# Patient Record
Sex: MALE | Race: WHITE
[De-identification: names, ages, dates, MRNs, and addresses within clinical notes are randomized per-mention and may not be internally consistent; named-entity substitution may affect disease eponyms.]

---

## 2018-11-10 ENCOUNTER — HOSPITAL ENCOUNTER (INPATIENT)
Dept: HOSPITAL 92 - ERS | Age: 63
LOS: 2 days | Discharge: HOME | DRG: 167 | End: 2018-11-12
Attending: INTERNAL MEDICINE | Admitting: INTERNAL MEDICINE
Payer: COMMERCIAL

## 2018-11-10 VITALS — BODY MASS INDEX: 40.1 KG/M2

## 2018-11-10 DIAGNOSIS — J44.9: ICD-10-CM

## 2018-11-10 DIAGNOSIS — F31.9: ICD-10-CM

## 2018-11-10 DIAGNOSIS — F12.10: ICD-10-CM

## 2018-11-10 DIAGNOSIS — E66.01: ICD-10-CM

## 2018-11-10 DIAGNOSIS — Z86.711: ICD-10-CM

## 2018-11-10 DIAGNOSIS — R91.1: ICD-10-CM

## 2018-11-10 DIAGNOSIS — R04.2: ICD-10-CM

## 2018-11-10 DIAGNOSIS — B95.4: ICD-10-CM

## 2018-11-10 DIAGNOSIS — R91.8: Primary | ICD-10-CM

## 2018-11-10 DIAGNOSIS — F17.210: ICD-10-CM

## 2018-11-10 DIAGNOSIS — I10: ICD-10-CM

## 2018-11-10 LAB
ALBUMIN SERPL BCG-MCNC: 4.3 G/DL (ref 3.4–4.8)
ALP SERPL-CCNC: 94 U/L (ref 40–150)
ALT SERPL W P-5'-P-CCNC: 13 U/L (ref 8–55)
ANION GAP SERPL CALC-SCNC: 11 MMOL/L (ref 10–20)
AST SERPL-CCNC: 15 U/L (ref 5–34)
BASOPHILS # BLD AUTO: 0 THOU/UL (ref 0–0.2)
BASOPHILS NFR BLD AUTO: 0.4 % (ref 0–1)
BILIRUB SERPL-MCNC: 0.3 MG/DL (ref 0.2–1.2)
BUN SERPL-MCNC: 13 MG/DL (ref 8.4–25.7)
CALCIUM SERPL-MCNC: 9.7 MG/DL (ref 7.8–10.44)
CHLORIDE SERPL-SCNC: 106 MMOL/L (ref 98–107)
CO2 SERPL-SCNC: 22 MMOL/L (ref 23–31)
CREAT CL PREDICTED SERPL C-G-VRATE: 0 ML/MIN (ref 70–130)
EOSINOPHIL # BLD AUTO: 0.1 THOU/UL (ref 0–0.7)
EOSINOPHIL NFR BLD AUTO: 1.6 % (ref 0–10)
GLOBULIN SER CALC-MCNC: 3.5 G/DL (ref 2.4–3.5)
GLUCOSE SERPL-MCNC: 107 MG/DL (ref 80–115)
HGB BLD-MCNC: 12.8 G/DL (ref 14–18)
LYMPHOCYTES # BLD: 1.8 THOU/UL (ref 1.2–3.4)
LYMPHOCYTES NFR BLD AUTO: 24.5 % (ref 21–51)
MCH RBC QN AUTO: 30.4 PG (ref 27–31)
MCV RBC AUTO: 88.4 FL (ref 78–98)
MONOCYTES # BLD AUTO: 0.5 THOU/UL (ref 0.11–0.59)
MONOCYTES NFR BLD AUTO: 7.5 % (ref 0–10)
NEUTROPHILS # BLD AUTO: 4.8 THOU/UL (ref 1.4–6.5)
NEUTROPHILS NFR BLD AUTO: 66.1 % (ref 42–75)
PLATELET # BLD AUTO: 241 THOU/UL (ref 130–400)
POTASSIUM SERPL-SCNC: 4.9 MMOL/L (ref 3.5–5.1)
RBC # BLD AUTO: 4.21 MILL/UL (ref 4.7–6.1)
SODIUM SERPL-SCNC: 134 MMOL/L (ref 136–145)
WBC # BLD AUTO: 7.2 THOU/UL (ref 4.8–10.8)

## 2018-11-10 PROCEDURE — 71046 X-RAY EXAM CHEST 2 VIEWS: CPT

## 2018-11-10 PROCEDURE — 71260 CT THORAX DX C+: CPT

## 2018-11-10 PROCEDURE — 87070 CULTURE OTHR SPECIMN AEROBIC: CPT

## 2018-11-10 PROCEDURE — 88112 CYTOPATH CELL ENHANCE TECH: CPT

## 2018-11-10 PROCEDURE — 87149 DNA/RNA DIRECT PROBE: CPT

## 2018-11-10 PROCEDURE — 99406 BEHAV CHNG SMOKING 3-10 MIN: CPT

## 2018-11-10 PROCEDURE — 85025 COMPLETE CBC W/AUTO DIFF WBC: CPT

## 2018-11-10 PROCEDURE — 87205 SMEAR GRAM STAIN: CPT

## 2018-11-10 PROCEDURE — 94640 AIRWAY INHALATION TREATMENT: CPT

## 2018-11-10 PROCEDURE — 80053 COMPREHEN METABOLIC PANEL: CPT

## 2018-11-10 PROCEDURE — 87040 BLOOD CULTURE FOR BACTERIA: CPT

## 2018-11-10 PROCEDURE — 80048 BASIC METABOLIC PNL TOTAL CA: CPT

## 2018-11-10 PROCEDURE — 88305 TISSUE EXAM BY PATHOLOGIST: CPT

## 2018-11-10 PROCEDURE — 36415 COLL VENOUS BLD VENIPUNCTURE: CPT

## 2018-11-10 PROCEDURE — 88313 SPECIAL STAINS GROUP 2: CPT

## 2018-11-10 PROCEDURE — 93005 ELECTROCARDIOGRAM TRACING: CPT

## 2018-11-10 PROCEDURE — 88312 SPECIAL STAINS GROUP 1: CPT

## 2018-11-10 RX ADMIN — HYDROCODONE BITARTRATE AND ACETAMINOPHEN PRN TAB: 10; 325 TABLET ORAL at 21:21

## 2018-11-10 NOTE — RAD
CHEST 2 VIEWS:

 

Date:  11/10/18 

 

HISTORY:  

Coughing up blood. 

 

COMPARISON:  

Radiograph from 2017. 

 

FINDINGS:

There is an abnormal round mass-like density in the right middle/lower lobe. Old left-sided rib fract
ures. Cardiac silhouette is within normal limits. 

 

IMPRESSION: 

Round nodule projecting over the right lower lung. Nonemergent CT of chest recommended. 

 

CODE T.

CODE LN.

 

 

POS: VERN

## 2018-11-10 NOTE — CT
CHEST CT SCAN WITH IV CONTRAST:

11/10/18

 

HISTORY: 

63-year-old male with history of coughing up bright breast blood since Friday. 

 

COMPARISON:  

Chest PA and lateral, 11/10/18. 

 

FINDINGS:  

Poorly circumscribed somewhat spiculated appearing 2.6 cm diameter mass opacity in the right middle l
obe adjacent to the minor fissure. There are some minimal patchy interstitial and alveolar nodular ch
anges in the right middle lobe as well. Very minute pleural based stranding in the right lung base. M
inimal ground glass opacity changes in the right upper lobe, nonspecific. No mediastinal mass or nancy
opathy. No pleural effusion. No pericardial effusion.  

 

IMPRESSION:  

2.6 cm diameter poorly circumscribed somewhat spiculated appearing mass in the right middle lobe with
 some additional patchy parenchymal changes In the right middle lobe and right upper lobe, nonspecifi
c, possibly some associated pneumonitis. I feel that this is most concerning for a malignancy. Consid
er followup PET scan for further assessment. 

 

POS: VERN

## 2018-11-10 NOTE — HP
REASON FOR ADMISSION:  Right lung mass, hemoptysis.

 

HISTORY OF PRESENTING ILLNESS:  The patient gives history of cough with 
expectoration of blood from last 3 days.  No history of fever.  Has some mild 
shortness of breath, but nothing out of the ordinary states patient.  No fever.
  Patient states he has had prior pneumonias multiple times and has been 
hospitalized here for the same.  He used to smoke one pack a day for the last 
25 years and currently is cutting down.  No history of weight loss or loss of 
appetite.

 

PAST MEDICAL AND SURGICAL HISTORY:  Hypertension, hypertriglyceridemia, history 
of pulmonary embolus 5 years back, right total knee replacement, back surgery, 
left forearm tendon reattachment, left rotator cuff repair, bipolar disorder.

 

CURRENT MEDICATIONS:  The patient is on lisinopril 10 mg twice daily, Norco 
p.r.n. for pain, Advil 800 mg twice daily, Zoloft 300 mg p.o. at bedtime, 
clonazepam 4 mg p.o. at bedtime, metoprolol extended release 50 mg twice daily.

 

ALLERGIES:  NAPROSYN, but he is comfortable taking Advil.

 

PERSONAL HISTORY:  Smokes one pack a day and has been doing so for the last 25 
years.  Drinks on social occasions.  Does not abuse alcohol.  Please note 
patient states he has been cutting down on his smoking and is currently down to 
2-3 per week.

 

FAMILY HISTORY:  Mother  at the age of 81 years.  Father  at the age of 
82 years.  He has had history of rectal cancer and nasal cancer as well.

 

CODE STATUS:  FULL.  Power of  is his wife.

 

REVIEW OF SYSTEMS:  The following complete review of systems was negative, 
unless otherwise mentioned in the HPI or below:

Constitutional:  Weight loss or gain, ability to conduct usual activities.

Skin:  Rash, itching.

Eyes:  Double vision, pain.

ENT/Mouth:  Nose bleeding, neck stiffness, pain, tenderness.

Cardiovascular:  Palpitations, dyspnea on exertion, orthopnea.

Respiratory:  Shortness of breath, wheezing, cough, hemoptysis, fever or night 
sweats.

Gastrointestinal:  Poor appetite, abdominal pain, heartburn, nausea, vomiting, 
constipation, or diarrhea.

Genitourinary:  Urgency, frequency, dysuria, nocturia.

Musculoskeletal:  Pain, swelling.

Neurologic/Psychiatric:  Anxiety, depression.

Allergy/Immunologic:  Skin rash, bleeding tendency. 

 

PHYSICAL EXAMINATION:

GENERAL:  Patient is a 63-year-old male who is currently not in any acute 
distress.

VITAL SIGNS:  Blood pressure 176/100, pulse 70 per minute, respiratory rate 18 
per minute, temperature 97.9 degrees Fahrenheit, saturating 97% on room air.

NECK:  Supple, no elevated JVD.

EYES:  Extraocular muscles intact.  Pupils reacting to light.

ORAL CAVITY:  Mucous membranes are moist.  No exudates or congestion.

CARDIOVASCULAR SYSTEM:  S1, S2 heard.  Regular rhythm.

RESPIRATORY SYSTEM:  Air entry 1+ bilateral.  Scattered rhonchi plus no rales.

ABDOMEN:  Soft, bowel sounds heard.  No tenderness, rigidity or guarding.

EXTREMITIES:  No peripheral edema or calf tenderness.

VASCULAR SYSTEM:  Peripheral pulses 2+ bilateral, no ischemic ulcerations or 
gangrene.

CENTRAL NERVOUS SYSTEM:  No gross focal deficits noted.  Patient is alert, awake
, oriented well.

PSYCHIATRIC SYSTEM:  The patient's mood is euthymic.  No hallucinations or 
delusions.

 

LABORATORY DATA AND IMAGING DATA:  White count of 7, hemoglobin and hematocrit 
12 and 37, platelet count 241 with 66% neutrophils.  Electrolytes are stable.  
Serum bicarbonate 22, BUN 13, creatinine 0.8.  Liver enzymes are within normal 
limits.  Albumin is 4.3.  CT chest done shows 2.6 cm poorly circumscribed 
somewhat spiculated appearing mass in the right middle lobe with additional 
patchy parenchymal changes in the right middle lobe and right upper lobe.  This 
is suspicious for malignancy.

 

CLINICAL IMPRESSION AND PLAN:  The patient will be admitted to medical floor 
for right lung mass with history of heavy smoking in the past.  He also has 
hemoptysis.  He will be placed on DuoNeb, short course of IV steroids for 24 
hours and will discontinue.  He will continue his home medications of Klonopin, 
lisinopril and Lopressor at a lower dose and sertraline.  The patient will 
likely need either percutaneous biopsy of his lung lesion or bronchoscopic 
biopsy.  Further procedures and plan will be per Pulmonary advice.  We will 
continue to closely monitor him on medical floor.

 

DENISE

## 2018-11-11 LAB
ANION GAP SERPL CALC-SCNC: 14 MMOL/L (ref 10–20)
BASOPHILS # BLD AUTO: 0 THOU/UL (ref 0–0.2)
BASOPHILS NFR BLD AUTO: 0.3 % (ref 0–1)
BUN SERPL-MCNC: 13 MG/DL (ref 8.4–25.7)
CALCIUM SERPL-MCNC: 9.5 MG/DL (ref 7.8–10.44)
CHLORIDE SERPL-SCNC: 105 MMOL/L (ref 98–107)
CO2 SERPL-SCNC: 21 MMOL/L (ref 23–31)
CREAT CL PREDICTED SERPL C-G-VRATE: 175 ML/MIN (ref 70–130)
EOSINOPHIL # BLD AUTO: 0 THOU/UL (ref 0–0.7)
EOSINOPHIL NFR BLD AUTO: 0.7 % (ref 0–10)
GLUCOSE SERPL-MCNC: 148 MG/DL (ref 80–115)
HGB BLD-MCNC: 12.8 G/DL (ref 14–18)
LYMPHOCYTES # BLD: 0.7 THOU/UL (ref 1.2–3.4)
LYMPHOCYTES NFR BLD AUTO: 11.5 % (ref 21–51)
MCH RBC QN AUTO: 29.4 PG (ref 27–31)
MCV RBC AUTO: 91.5 FL (ref 78–98)
MONOCYTES # BLD AUTO: 0.1 THOU/UL (ref 0.11–0.59)
MONOCYTES NFR BLD AUTO: 2.2 % (ref 0–10)
NEUTROPHILS # BLD AUTO: 5.3 THOU/UL (ref 1.4–6.5)
NEUTROPHILS NFR BLD AUTO: 85.4 % (ref 42–75)
PLATELET # BLD AUTO: 242 THOU/UL (ref 130–400)
POTASSIUM SERPL-SCNC: 4.2 MMOL/L (ref 3.5–5.1)
RBC # BLD AUTO: 4.36 MILL/UL (ref 4.7–6.1)
SODIUM SERPL-SCNC: 136 MMOL/L (ref 136–145)
WBC # BLD AUTO: 6.2 THOU/UL (ref 4.8–10.8)

## 2018-11-11 RX ADMIN — HYDROCODONE BITARTRATE AND ACETAMINOPHEN PRN TAB: 10; 325 TABLET ORAL at 21:04

## 2018-11-11 RX ADMIN — HYDROCODONE BITARTRATE AND ACETAMINOPHEN PRN TAB: 10; 325 TABLET ORAL at 15:38

## 2018-11-11 RX ADMIN — HYDROCODONE BITARTRATE AND ACETAMINOPHEN PRN TAB: 10; 325 TABLET ORAL at 09:20

## 2018-11-11 RX ADMIN — HYDROCODONE BITARTRATE AND ACETAMINOPHEN PRN TAB: 10; 325 TABLET ORAL at 05:11

## 2018-11-11 NOTE — PDOC.PN
- Subjective


Encounter Start Date: 11/11/18


Encounter Start Time: 09:45


Subjective: a bit anxious, no sob


-: no further hemoptysis





- Objective


Resuscitation Status: 


 











Resuscitation Status           FULL:Full Resuscitation














MAR Reviewed: Yes


Vital Signs & Weight: 


 Vital Signs (12 hours)











  Temp Pulse Resp BP BP Pulse Ox


 


 11/11/18 09:15     114/55 L  


 


 11/11/18 08:00  98.4 F  89  16   114/55 L  94 L


 


 11/11/18 07:55       96


 


 11/11/18 07:07   75  16    94 L


 


 11/10/18 23:31   71  16    97


 


 11/10/18 22:03   67  16    97








 Weight











Weight                         296 lb














Result Diagrams: 


 11/11/18 04:17





 11/11/18 04:17





Phys Exam





- Physical Examination


HEENT: PERRLA, moist MMs


Neck: no JVD, supple


Respiratory: no wheezing, no rales


Cardiovascular: RRR, no significant murmur


Gastrointestinal: soft, non-tender, positive bowel sounds


Musculoskeletal: no edema, pulses present


Neurological: non-focal, moves all 4 limbs


Psychiatric: normal affect, A&O x 3





Dx/Plan


(1) Mass of middle lobe of right lung


Code(s): R91.8 - OTHER NONSPECIFIC ABNORMAL FINDING OF LUNG FIELD   Status: 

Acute   





(2) COPD (chronic obstructive pulmonary disease)


Status: Chronic   


Qualifiers: 


   COPD type: chronic bronchitis   Chronic bronchitis type: unspecified   

Qualified Code(s): J42 - Unspecified chronic bronchitis   





(3) Hemoptysis


Code(s): R04.2 - HEMOPTYSIS   Status: Acute   





(4) Bipolar disorder


Code(s): F31.9 - BIPOLAR DISORDER, UNSPECIFIED   Status: Chronic   





(5) Tobacco abuse


Code(s): Z72.0 - TOBACCO USE   Status: Chronic   





(6) Hypertension


Code(s): I10 - ESSENTIAL (PRIMARY) HYPERTENSION   Status: Chronic   


Qualifiers: 


   Hypertension type: essential hypertension   Qualified Code(s): I10 - 

Essential (primary) hypertension   





(7) Morbid obesity with BMI of 40.0-44.9, adult


Code(s): E66.01 - MORBID (SEVERE) OBESITY DUE TO EXCESS CALORIES; Z68.41 - BODY 

MASS INDEX (BMI) 40.0-44.9, ADULT   Status: Chronic   





- Plan


d/w , likely bronchoscopy in am, he will d/w patient


-: dc steroids, on levaquin, may dc in am


-: duonebs


-: continue lopressor, lisinopril, zoloft high dose and clonazepam


-: will f/u





* .








Review of Systems





- Medications/Allergies


Allergies/Adverse Reactions: 


 Allergies











Allergy/AdvReac Type Severity Reaction Status Date / Time


 


naproxen [From Naprosyn] Allergy   Verified 11/10/18 20:37











Medications: 


 Current Medications





Acetaminophen (Tylenol)  650 mg PO Q4H PRN


   PRN Reason: Headache/Fever/Mild Pain (1-3)


Hydrocodone Bitart/Acetaminophen (Norco 10/325)  1 tab PO Q4H PRN


   PRN Reason: Moderate Pain (4-6)


   Last Admin: 11/11/18 09:20 Dose:  1 tab


Albuterol/Ipratropium (Duoneb)  3 ml NEB [id number] [facility]


   Last Admin: 11/11/18 07:07 Dose:  3 ml


Clonazepam (Klonopin)  2 mg PO HS [facility]


   Last Admin: 11/10/18 21:15 Dose:  2 mg


Enoxaparin Sodium (Lovenox)  40 mg SC 0900 [facility]


   Last Admin: 11/11/18 09:16 Dose:  40 mg


Famotidine (Pepcid)  20 mg PO BID [facility]


   Last Admin: 11/11/18 09:16 Dose:  20 mg


Guaifenesin (Mucinex)  600 mg PO Q6HR [facility]


   Last Admin: 11/11/18 05:11 Dose:  600 mg


Guaifenesin/Dextromethorphan (Robitussin Dm)  15 ml PO Q4H PRN


   PRN Reason: Cough


Levofloxacin 500 mg/ Device  100 mls @ 100 mls/hr IVPB Q24HR [facility]


   Last Admin: 11/10/18 20:57 Dose:  100 mls


Lisinopril (Zestril)  10 mg PO BID [facility]


   Last Admin: 11/11/18 09:15 Dose:  10 mg


Metoprolol Tartrate (Lopressor)  25 mg PO BID [facility]


   Last Admin: 11/11/18 09:16 Dose:  25 mg


Buprenorphine Patch  0 each TOP Q7D [facility]


Sertraline HCl (Zoloft)  300 mg PO HS [facility]


   Last Admin: 11/10/18 21:14 Dose:  300 mg

## 2018-11-11 NOTE — CON
DATE OF CONSULTATION:  2018

 

SERVICE:  Pulmonary Medicine.

 

REASON FOR CONSULTATION:  Hemoptysis.

 

HISTORY OF PRESENT ILLNESS:  The patient is a 63-year-old white male with past 
medical history significant for excessive tobacco abuse, and marijuana abuse 
when he was in his 30s.  Ultimately, he has been making an attempt to quitting 
both these habits.  He actually discontinued marijuana when he was 34 years 
old.  Either way, he was in his usual state of health when he started having 
hemoptysis.  This was not preceded by any infectious prodrome, fevers, chills, 
nausea, vomiting, cough, sputum production, or congestion in the face.  His 
weight has been stable.  He denies having any significant night sweats.  He 
presented to the emergency department.  He coughed up about 2-3 Yuliet cups full 
of blood.  It was fresh blood initially, but then started darken up through 
time.  He has never had an event like this occur.

 

PAST MEDICAL HISTORY:

1.  History of pulmonary embolism.

2.  Hypertension.

3.  Dyslipidemia.

4.  Bipolar disorder.

 

PAST SURGICAL HISTORY:

1.  Left rotator cuff repair.

2.  Left forearm tendon repair.

3.  Back surgery.

4.  Right total knee replacement.

 

ALLERGIES:  NAPROSYN.

 

MEDICATIONS:  List of his inpatient medications was reviewed.  No specific 
updates were made at this time.

 

FAMILY HISTORY:  Noncontributory.

 

SOCIAL HISTORY:  He smokes half to a pack of cigarettes on a daily basis.  He 
has done so for over 30 years.  He had a remote history of very significant 
marijuana use.  He denies any alcohol or illicit drugs.  He has no exposure to 
chemicals, dust, asbestos or tuberculosis otherwise.

 

REVIEW OF SYSTEMS:  General, head, ears, eyes, nose, throat, cardiovascular, 
respiratory, GI, , musculoskeletal, neurologic and skin is negative except as 
mentioned in the HPI.

 

PHYSICAL EXAMINATION:

VITAL SIGNS:  Afebrile, pulse 99, blood pressure 157/94, respirations 16, 
saturation 94% on room air.

GENERAL:  The patient is awake and alert, in no apparent distress.

LUNGS:  Excellent air entry is present.  There is no prolonged expiratory phase 
or wheezing present.

HEART:  Normal rate, regular.

ABDOMEN:  Soft, nontender, nondistended.  Bowel sounds are positive.

MUSCULOSKELETAL:  No cyanosis or clubbing.  There is no pitting in the 
bilateral lower extremities.

NEUROLOGIC:  Grossly nonfocal.

 

LABORATORY DATA:  WBC 6.2, hemoglobin 12.8, platelets 242,000.  Basic metabolic 
profile is essentially unremarkable.  Liver function studies are also normal.  
Hemoglobin is stable.  The streptococcus is growing in 1 of 2 blood cultures.  
Respiratory cultures negative to date.  Of note, there are very few white blood 
cells present.

 

IMAGIN.  Chest x-ray demonstrates right mid lung zone pulmonary nodule/mass:

2.  CT of the chest demonstrates right middle lobe pulmonary nodule. It has a 
spiculated appearance and is pressed up against the fissure between the right 
middle lobe, and the right lower lobe.  There is a right anterior mediastinal 
lymph node identified.

 

ASSESSMENT:

1.  Pulmonary mass.

2.  Massive hemoptysis, resolved.

3.  History of pulmonary embolism.

 

DISCUSSION AND PLAN:  The patient will undergo a bronchoscopy tomorrow morning.
  Hopefully, we will be able to identify a lesion and takes some true biopsies 
of it.  If not, random biopsies will be taken from the airway of interest.  We 
will wait for the results.  He does appreciate there is possibility of a false 
negative.  If that were the case, a transcutaneous biopsy would be required.  
Pulmonary Critical Care will continue to follow along.

 

70 minutes have been devoted to this patient in various activities.  I 
personally reviewed all imaging studies and laboratory data noted within this 
document.  For fifty percent of this time, I was interacting with the patient 
at the bedside or coordinating care with the care team.  For the remainder of 
the time I was immediately available to the patient in the hospital unit.  



DENISE

## 2018-11-12 VITALS — TEMPERATURE: 97.2 F

## 2018-11-12 VITALS — SYSTOLIC BLOOD PRESSURE: 135 MMHG | DIASTOLIC BLOOD PRESSURE: 76 MMHG

## 2018-11-12 PROCEDURE — 0BD58ZX EXTRACTION OF RIGHT MIDDLE LOBE BRONCHUS, VIA NATURAL OR ARTIFICIAL OPENING ENDOSCOPIC, DIAGNOSTIC: ICD-10-PCS | Performed by: INTERNAL MEDICINE

## 2018-11-12 PROCEDURE — 0B9D8ZX DRAINAGE OF RIGHT MIDDLE LUNG LOBE, VIA NATURAL OR ARTIFICIAL OPENING ENDOSCOPIC, DIAGNOSTIC: ICD-10-PCS | Performed by: INTERNAL MEDICINE

## 2018-11-12 PROCEDURE — 0BBD8ZX EXCISION OF RIGHT MIDDLE LUNG LOBE, VIA NATURAL OR ARTIFICIAL OPENING ENDOSCOPIC, DIAGNOSTIC: ICD-10-PCS | Performed by: INTERNAL MEDICINE

## 2018-11-12 RX ADMIN — HYDROCODONE BITARTRATE AND ACETAMINOPHEN PRN TAB: 10; 325 TABLET ORAL at 15:16

## 2018-11-12 NOTE — PDOC.PN
- Subjective


Encounter Start Date: 11/12/18


Encounter Start Time: 09:45


Subjective: no sob


-: family at bedside


-: says he is less anxious this morning





- Objective


Resuscitation Status: 


 











Resuscitation Status           FULL:Full Resuscitation














MAR Reviewed: Yes


Vital Signs & Weight: 


 Vital Signs (12 hours)











  Temp Pulse Resp BP Pulse Ox


 


 11/12/18 08:00  97.2 F L  71  16  112/62  93 L


 


 11/12/18 06:35   71  16   94 L


 


 11/12/18 01:10   76  18   96








 Weight











Weight                         296 lb














I&O: 


 











 11/11/18 11/12/18 11/13/18





 06:59 06:59 06:59


 


Intake Total  2450 


 


Balance  2450 











Result Diagrams: 


 11/11/18 04:17





 11/11/18 04:17





Phys Exam





- Physical Examination


HEENT: PERRLA, moist MMs


Neck: no JVD, supple


Respiratory: no wheezing, no rales


Cardiovascular: RRR, no significant murmur


Gastrointestinal: soft, non-tender, positive bowel sounds


Musculoskeletal: no edema, pulses present


Neurological: non-focal, moves all 4 limbs


Psychiatric: normal affect, A&O x 3





Dx/Plan


(1) Mass of middle lobe of right lung


Code(s): R91.8 - OTHER NONSPECIFIC ABNORMAL FINDING OF LUNG FIELD   Status: 

Acute   





(2) COPD (chronic obstructive pulmonary disease)


Status: Chronic   


Qualifiers: 


   COPD type: chronic bronchitis   Chronic bronchitis type: unspecified   

Qualified Code(s): J42 - Unspecified chronic bronchitis   





(3) Hemoptysis


Code(s): R04.2 - HEMOPTYSIS   Status: Acute   





(4) Bipolar disorder


Code(s): F31.9 - BIPOLAR DISORDER, UNSPECIFIED   Status: Chronic   





(5) Tobacco abuse


Code(s): Z72.0 - TOBACCO USE   Status: Chronic   





(6) Hypertension


Code(s): I10 - ESSENTIAL (PRIMARY) HYPERTENSION   Status: Chronic   


Qualifiers: 


   Hypertension type: essential hypertension   Qualified Code(s): I10 - 

Essential (primary) hypertension   





(7) Morbid obesity with BMI of 40.0-44.9, adult


Code(s): E66.01 - MORBID (SEVERE) OBESITY DUE TO EXCESS CALORIES; Z68.41 - BODY 

MASS INDEX (BMI) 40.0-44.9, ADULT   Status: Chronic   





- Plan


for bronchoscopy today


-: likely blood cs 1/2 strep is contamination?


-: is on levaquin, nebs


-: dc plan per 's adv





* .








Review of Systems





- Medications/Allergies


Allergies/Adverse Reactions: 


 Allergies











Allergy/AdvReac Type Severity Reaction Status Date / Time


 


naproxen [From Naprosyn] Allergy   Verified 11/10/18 20:37











Medications: 


 Current Medications





Acetaminophen (Tylenol)  650 mg PO Q4H PRN


   PRN Reason: Headache/Fever/Mild Pain (1-3)


Hydrocodone Bitart/Acetaminophen (Norco 10/325)  1 tab PO Q4H PRN


   PRN Reason: Moderate Pain (4-6)


   Last Admin: 11/11/18 21:04 Dose:  1 tab


Albuterol/Ipratropium (Duoneb)  3 ml NEB [id number] [facility]


   Last Admin: 11/12/18 06:35 Dose:  3 ml


Alprazolam (Xanax)  1 mg PO QIDPRN PRN


   PRN Reason: Anxiety


   Last Admin: 11/11/18 10:55 Dose:  1 mg


Clonazepam (Klonopin)  2 mg PO HS [facility]


   Last Admin: 11/11/18 21:02 Dose:  2 mg


Famotidine (Pepcid)  20 mg PO BID [facility]


   Last Admin: 11/11/18 21:02 Dose:  20 mg


Guaifenesin (Mucinex)  600 mg PO Q6HR [facility]


   Last Admin: 11/12/18 05:30 Dose:  Not Given


Levofloxacin 500 mg/ Device  100 mls @ 100 mls/hr IVPB Q24HR [facility]


   Last Admin: 11/11/18 21:00 Dose:  100 mls


Lisinopril (Zestril)  10 mg PO BID [facility]


   Last Admin: 11/11/18 21:00 Dose:  10 mg


Metoprolol Tartrate (Lopressor)  25 mg PO BID [facility]


   Last Admin: 11/11/18 21:02 Dose:  25 mg


Morphine Sulfate (Morphine)  2 mg SLOW IVP 0830 [facility]


   Stop: 11/12/18 11:00


   Last Admin: 11/12/18 08:42 Dose:  2 mg


Buprenorphine Patch  0 each TOP Q7D [facility]


Sertraline HCl (Zoloft)  300 mg PO HS [facility]


   Last Admin: 11/11/18 21:02 Dose:  300 mg

## 2018-11-12 NOTE — OP
DATE OF SERVICE:  11/12/2018

 

SERVICE:  Pulmonary Medicine.

 

PROCEDURES:  Fiberoptic bronchoscopy with:

1.  Visual airway inspection.

2.  Endobronchial brush from the right middle lobe.

3.  Bronchoalveolar lavage from the right middle lobe.

4.  Transbronchial biopsies of the right middle lobe.

 

PREPROCEDURE DIAGNOSES:

1.  Massive hemoptysis.

2.  Pulmonary mass.

 

POSTPROCEDURE DIAGNOSES:

1.  Massive hemoptysis.

2.  Pulmonary mass.

 

PROCEDURE :  Jonny Pemberton MD

 

MEDICATIONS USED:  For list of medications use, please refer to anesthesia documentation.

 

PREANESTHESIA ASSESSMENT:  H and P had been performed.  The patient's medications and allergies were 
reviewed.  Informed consent was obtained after discussing risks, benefits, and rationale for performi
ng the procedure as well as alternative options.

 

DESCRIPTION OF PROCEDURE:  A timeout was performed identifying correct patient and procedure with nam
e and date of birth.  A diagnostic fiberoptic bronchoscope was introduced through the existing 8.5 en
dotracheal tube.  The bronchoscope was advanced into the trachea where a tracheobronchial tree inspec
tion was carried out with clear identification of the right upper lobe, right middle lobe, right lowe
r lobe, left upper lobe, lingula, and left lower lobe.  Anatomy was normal to the segmental level.  T
here was some dried blood coming from the right middle lobe, lateral segment.  Secretions were minima
l.  A bronchoalveolar lavage was obtained from the lateral segment of the right middle lobe.  Endobro
nchial brushings, and transbronchial biopsies were obtained from the right middle lobe under fluorosc
opic guidance.  Hemostasis was verified and the bronchoscope was subsequently removed from the patien
t.  Post-procedure fluoroscopy did not demonstrate a pneumothorax.

 

FINDINGS:

1.  Secretions were minimal.

2.  Dried blood was present in the lateral segment of the right lower lobe.

3.  No discrete endobronchial disease was identified.

 

SPECIMENS OBTAINED:  Pathology on brush, BAL, and transbronchial biopsies.

 

COMPLICATIONS:  None.

 

ESTIMATED BLOOD LOSS:  2 mL

 

FLUOROSCOPY TIME:  2 minutes 31 seconds.

 

DISPOSITION:  The patient will be transitioned to the postanesthesia care unit.  When he meets criter
ia, he will be transitioned back to his hospital room.  He is a candidate for transition home today.

## 2018-11-12 NOTE — PRG
DATE OF SERVICE:  11/12/2018

 

SERVICE:  Pulmonary Medicine.

 

INTERVAL HISTORY:  The patient is doing fine from a respiratory standpoint.  Denies any current fever
s, chills, shortness of breath or chest discomfort.  He is breathing comfortably.  There have been no
 additional episodes of hemoptysis.

 

PHYSICAL EXAMINATION:

VITAL SIGNS:  Afebrile, pulse 71, blood pressure 112/62, respirations 16, saturation 93% on room air.


GENERAL:  The patient is awake and alert, in no apparent distress.

LUNGS:  Excellent air entry.  There is no prolonged expiratory phase or wheezing present.

HEENT:  Normocephalic, atraumatic.  Sclerae are white.  Conjunctivae are pink.  Oral mucosa is moist 
without lesions.

HEART:  Normal rate, regular.

ABDOMEN:  Soft, nontender, nondistended.  Bowel sounds are positive.

MUSCULOSKELETAL:  No cyanosis or clubbing.  No pitting in the bilateral lower extremities.

NEUROLOGIC:  Grossly nonfocal.

 

ASSESSMENT:

1.  Pulmonary mass.

2.  Massive hemoptysis, resolved.

3.  History of pulmonary embolism.

 

DISCUSSION AND PLAN:  We will proceed with bronchoscopy today.  After the bronchoscopy, he will be st
able for transition out of the hospital.  I have him follow up with me in clinic later this week, so 
we can discuss the results of this study and determine whether or not additional testing is warranted
 in the future.  Pulmonary Critical Care will continue following.

## 2018-11-13 NOTE — DIS
DATE OF ADMISSION:  11/10/2018

 

DATE OF DISCHARGE:  11/12/2018 

 

DISCHARGE DISPOSITION:  To home.

 

PRIMARY DISCHARGE DIAGNOSES:  Lung mass, status post bronchoscopy with biopsy; hemoptysis, resolved.

 

SECONDARY DISCHARGE DIAGNOSES:  Chronic obstructive pulmonary disease, tobacco abuse, bipolar disorde
r, hypertension, morbid obesity.

 

PROCEDURES DONE DURING HOSPITALIZATION:  CT chest done on the day of admission showed 2.6 cm poorly c
ircumscribed spiculated mass in the right middle lobe with additional patchy parenchymal changes in t
he right middle lobe and right upper lobe, had bronchoscopy with biopsies done on 11/12/2018 by Dr. BRITTNEY hernandez, 1 of 2 blood cultures grew alpha-hemolytic streptococcus.  White count of 6, H&H 12 and 39, p
latelet count 242.

 

DISCHARGE MEDICATIONS:  Buprenorphine patch once weekly, clonazepam 4 mg p.o. at bedtime, Norco p.r.n
. for pain, lisinopril 10 mg twice daily, Toprol-XL as before, sertraline 300 mg p.o. at bedtime, Lev
aquin 500 mg p.o. daily for another 4 days.

 

ALLERGIES:  NAPROSYN.

 

INPATIENT CONSULTS:  Dr. Pemberton for Pulmonology.

 

DISCHARGE PLAN:  Patient to follow up with Dr. Pemberton as advised and primary care physician in [address] [place] [address]

 

BRIEF COURSE DURING HOSPITALIZATION:  Patient initially came to ER with complaints of cough with hemo
ptysis.  Initial x-ray and CAT scan done was suspicious for right upper lobe/middle lobe mass.  He ha
s had consultation with Dr. Pemberton.  The patient has had bronchoscopy done with biopsies taken.  His
 hemoptysis has resolved.  One of 2 cultures incidentally grew alpha-hemolytic strep.  He is on Levaq
uin and will need to continue for another 4 days.  The patient is otherwise hemodynamically stable an
d will be discharged home.  The patient will follow up with Dr. Pemberton in a week to discuss the biop
sy results and the plan.  He has been cleared by Dr. Pemberton for discharge.  Please see a face-to-fac
e documentation [facility] day [facility]

## 2018-11-29 ENCOUNTER — HOSPITAL ENCOUNTER (OUTPATIENT)
Dept: HOSPITAL 92 - PET | Age: 63
Discharge: HOME | End: 2018-11-29
Attending: INTERNAL MEDICINE
Payer: COMMERCIAL

## 2018-11-29 DIAGNOSIS — R91.1: Primary | ICD-10-CM

## 2018-11-29 PROCEDURE — A9552 F18 FDG: HCPCS

## 2018-11-29 PROCEDURE — 78815 PET IMAGE W/CT SKULL-THIGH: CPT

## 2019-06-03 ENCOUNTER — HOSPITAL ENCOUNTER (OUTPATIENT)
Dept: HOSPITAL 92 - LABBT | Age: 64
Discharge: HOME | End: 2019-06-03
Attending: ORTHOPAEDIC SURGERY
Payer: COMMERCIAL

## 2019-06-03 ENCOUNTER — HOSPITAL ENCOUNTER (INPATIENT)
Dept: HOSPITAL 92 - SURG A | Age: 64
LOS: 9 days | Discharge: HOME HEALTH | DRG: 470 | End: 2019-06-12
Attending: ORTHOPAEDIC SURGERY | Admitting: ORTHOPAEDIC SURGERY
Payer: COMMERCIAL

## 2019-06-03 VITALS — BODY MASS INDEX: 40.6 KG/M2

## 2019-06-03 DIAGNOSIS — M17.12: Primary | ICD-10-CM

## 2019-06-03 DIAGNOSIS — Z79.899: ICD-10-CM

## 2019-06-03 DIAGNOSIS — E78.5: ICD-10-CM

## 2019-06-03 DIAGNOSIS — Z72.0: ICD-10-CM

## 2019-06-03 DIAGNOSIS — Z88.6: ICD-10-CM

## 2019-06-03 DIAGNOSIS — Z86.711: ICD-10-CM

## 2019-06-03 DIAGNOSIS — E66.01: ICD-10-CM

## 2019-06-03 DIAGNOSIS — J44.9: ICD-10-CM

## 2019-06-03 DIAGNOSIS — I10: ICD-10-CM

## 2019-06-03 DIAGNOSIS — M17.12: ICD-10-CM

## 2019-06-03 DIAGNOSIS — Z79.01: ICD-10-CM

## 2019-06-03 DIAGNOSIS — Z01.810: Primary | ICD-10-CM

## 2019-06-03 DIAGNOSIS — F31.9: ICD-10-CM

## 2019-06-03 LAB
ANION GAP SERPL CALC-SCNC: 15 MMOL/L (ref 10–20)
APTT PPP: 34.3 SEC (ref 22.9–36.1)
BUN SERPL-MCNC: 17 MG/DL (ref 8.4–25.7)
CALCIUM SERPL-MCNC: 10.8 MG/DL (ref 7.8–10.44)
CHLORIDE SERPL-SCNC: 103 MMOL/L (ref 98–107)
CO2 SERPL-SCNC: 26 MMOL/L (ref 23–31)
CREAT CL PREDICTED SERPL C-G-VRATE: 0 ML/MIN (ref 70–130)
CRYSTALS URNS QL MICRO: (no result) HPF
GLUCOSE SERPL-MCNC: 107 MG/DL (ref 80–115)
HGB BLD-MCNC: 14.3 G/DL (ref 14–18)
HYALINE CASTS #/AREA URNS LPF: (no result) LPF
INR PPP: 1
MCH RBC QN AUTO: 30.6 PG (ref 27–31)
MCV RBC AUTO: 87.4 FL (ref 78–98)
PLATELET # BLD AUTO: 221 THOU/UL (ref 130–400)
POTASSIUM SERPL-SCNC: 4.6 MMOL/L (ref 3.5–5.1)
PROTHROMBIN TIME: 12.9 SEC (ref 12–14.7)
RBC # BLD AUTO: 4.69 MILL/UL (ref 4.7–6.1)
RBC UR QL AUTO: (no result) HPF (ref 0–3)
SODIUM SERPL-SCNC: 139 MMOL/L (ref 136–145)
WBC # BLD AUTO: 5.3 THOU/UL (ref 4.8–10.8)
WBC UR QL AUTO: (no result) HPF (ref 0–3)

## 2019-06-03 PROCEDURE — 85730 THROMBOPLASTIN TIME PARTIAL: CPT

## 2019-06-03 PROCEDURE — C1776 JOINT DEVICE (IMPLANTABLE): HCPCS

## 2019-06-03 PROCEDURE — 86900 BLOOD TYPING SEROLOGIC ABO: CPT

## 2019-06-03 PROCEDURE — 86901 BLOOD TYPING SEROLOGIC RH(D): CPT

## 2019-06-03 PROCEDURE — 93010 ELECTROCARDIOGRAM REPORT: CPT

## 2019-06-03 PROCEDURE — 86850 RBC ANTIBODY SCREEN: CPT

## 2019-06-03 PROCEDURE — 85610 PROTHROMBIN TIME: CPT

## 2019-06-03 PROCEDURE — 85027 COMPLETE CBC AUTOMATED: CPT

## 2019-06-03 PROCEDURE — 36415 COLL VENOUS BLD VENIPUNCTURE: CPT

## 2019-06-03 PROCEDURE — 93005 ELECTROCARDIOGRAM TRACING: CPT

## 2019-06-03 PROCEDURE — 81015 MICROSCOPIC EXAM OF URINE: CPT

## 2019-06-03 PROCEDURE — 87081 CULTURE SCREEN ONLY: CPT

## 2019-06-03 PROCEDURE — 80048 BASIC METABOLIC PNL TOTAL CA: CPT

## 2019-06-03 PROCEDURE — C1713 ANCHOR/SCREW BN/BN,TIS/BN: HCPCS

## 2019-06-03 NOTE — EKG
Test Reason : 

Blood Pressure : ***/*** mmHG

Vent. Rate : 062 BPM     Atrial Rate : 062 BPM

   P-R Int : 224 ms          QRS Dur : 096 ms

    QT Int : 382 ms       P-R-T Axes : 037 047 062 degrees

   QTc Int : 387 ms

 

Sinus rhythm with 1st degree A-V block

Otherwise normal ECG

When compared with ECG of 10-NOV-2018 17:35,

Borderline criteria for Inferior infarct are no longer Present

Confirmed by LAUREN RODRIGUEZ,  SMike (4) on 6/3/2019 5:35:56 PM

 

Referred By:  LUIS           Confirmed By:DR. RICHARD AGUILAR MD

## 2019-06-10 PROCEDURE — 0SRD069 REPLACEMENT OF LEFT KNEE JOINT WITH OXIDIZED ZIRCONIUM ON POLYETHYLENE SYNTHETIC SUBSTITUTE, CEMENTED, OPEN APPROACH: ICD-10-PCS | Performed by: ORTHOPAEDIC SURGERY

## 2019-06-10 RX ADMIN — HYDROCODONE BITARTRATE AND ACETAMINOPHEN PRN TAB: 10; 325 TABLET ORAL at 23:10

## 2019-06-10 RX ADMIN — VANCOMYCIN HYDROCHLORIDE SCH MLS: 1 INJECTION, POWDER, LYOPHILIZED, FOR SOLUTION INTRAVENOUS at 23:02

## 2019-06-10 RX ADMIN — ASPIRIN SCH MG: 81 TABLET ORAL at 20:53

## 2019-06-10 RX ADMIN — HYDROCODONE BITARTRATE AND ACETAMINOPHEN PRN TAB: 10; 325 TABLET ORAL at 18:30

## 2019-06-10 RX ADMIN — Medication SCH: at 20:53

## 2019-06-10 NOTE — RAD
EXAM: 2 views of the left knee



HISTORY: Knee pain



COMPARISON: None



FINDINGS: The patient is status post left knee arthroplasty without perihardware lucency or fracture.
 Air in the soft tissues and overlying staples are from recent surgery.



IMPRESSION: Status post left knee arthroplasty without evidence of complication.



Reported By: Victor M Cook 

Electronically Signed:  6/10/2019 5:21 PM

## 2019-06-11 LAB
HGB BLD-MCNC: 11.8 G/DL (ref 14–18)
MCH RBC QN AUTO: 30.7 PG (ref 27–31)
MCV RBC AUTO: 89.2 FL (ref 78–98)
PLATELET # BLD AUTO: 176 THOU/UL (ref 130–400)
RBC # BLD AUTO: 3.84 MILL/UL (ref 4.7–6.1)
WBC # BLD AUTO: 9.8 THOU/UL (ref 4.8–10.8)

## 2019-06-11 RX ADMIN — VANCOMYCIN HYDROCHLORIDE SCH MLS: 1 INJECTION, POWDER, LYOPHILIZED, FOR SOLUTION INTRAVENOUS at 23:35

## 2019-06-11 RX ADMIN — Medication SCH: at 08:41

## 2019-06-11 RX ADMIN — HYDROCODONE BITARTRATE AND ACETAMINOPHEN PRN TAB: 10; 325 TABLET ORAL at 06:45

## 2019-06-11 RX ADMIN — ASPIRIN SCH MG: 81 TABLET ORAL at 08:35

## 2019-06-11 RX ADMIN — HYDROCODONE BITARTRATE AND ACETAMINOPHEN PRN TAB: 10; 325 TABLET ORAL at 19:28

## 2019-06-11 RX ADMIN — Medication SCH: at 21:07

## 2019-06-11 RX ADMIN — HYDROCODONE BITARTRATE AND ACETAMINOPHEN PRN TAB: 10; 325 TABLET ORAL at 23:39

## 2019-06-11 RX ADMIN — VANCOMYCIN HYDROCHLORIDE SCH MLS: 1 INJECTION, POWDER, LYOPHILIZED, FOR SOLUTION INTRAVENOUS at 13:06

## 2019-06-11 RX ADMIN — ASPIRIN SCH MG: 81 TABLET ORAL at 21:05

## 2019-06-11 RX ADMIN — HYDROCODONE BITARTRATE AND ACETAMINOPHEN PRN TAB: 10; 325 TABLET ORAL at 15:09

## 2019-06-11 NOTE — CON
DATE OF CONSULTATION:  06/11/2019



PRIMARY CARE PROVIDER:  Calvin Siegel MD



CHIEF COMPLAINT:  Management of medical comorbidities.



HISTORY OF PRESENT ILLNESS:  Mr. Barroso is a pleasant 63-year-old gentleman, who

was seen at [facility] on June 11, 2019. 



Yesterday, he underwent left total knee arthroplasty.  Hospitalist Service has been

consulted for management of medical comorbidities. 



He denies any chest pain or shortness of breath.  He denies any fevers or chills.

He denies any nausea or vomiting.  He reports that left knee pain is manageable with

medications. 



REVIEW OF SYSTEMS:  All other systems reviewed and found to be negative.



PAST MEDICAL HISTORY:  Hypertension, dyslipidemia, and pulmonary embolism.



PAST SURGICAL HISTORY:  Right total knee replacement, back surgery, left forearm

tendon reattachment, left rotator cuff repair, and left total knee arthroplasty. 



PSYCHIATRIC HISTORY:  Bipolar disorder.



SOCIAL HISTORY:  The patient denies tobacco use, alcohol use, or recreational drug

use. 



FAMILY HISTORY:  Rectal cancer and nasal cancer in his father.



ALLERGIES:  NAPROXEN.



HOME MEDICATIONS:  

1. Norco 10/325 mg every 6 hours.

2. Buprenorphine patch every week.

3. Apixaban 2.5 mg daily.

4. Clonazepam 4 mg at bedtime.

5. Flexeril 10 mg at bedtime.

6. Famotidine 20 mg daily.

7. Ibuprofen 800 mg 4 times a day.

8. Lisinopril 20 mg 2 times a day.

9. Toprol- mg 2 times a day.

10. Sertraline 300 mg at bedtime.



PHYSICAL EXAMINATION:

GENERAL:  Mr. Barroso is awake and alert, not in acute distress. 

VITAL SIGNS:  Blood pressure is 101/65, pulse 80, respiratory rate 18, and oxygen

saturation 92% on room air.  He is afebrile.  He is morbidly obese, with a BMI of

40.7. 

EYES:  No scleral icterus, no conjunctival pallor. 

ENT:  Moist mucosal membranes.  No oropharyngeal erythema or exudates. 

NECK:  Supple, nontender, trachea is midline. 

RESPIRATORY:  Accessory muscles of breathing are not active.  Chest wall movements

are symmetric bilaterally. 

LUNGS:  Clear to auscultation without wheeze, rhonchi, or crepitations. 

CARDIOVASCULAR:  S1 and S2 are heard, regular.  Peripheral pulses palpable. 

ABDOMEN:  Soft, nontender, bowel sounds heard. 

NEUROLOGIC:  Cranial nerves 2 through 12 are intact. 

MUSCULOSKELETAL:  Status post left knee surgery, the patient is moving all 4

extremities. 

SKIN:  No rashes or subcutaneous nodules. 

LYMPHATIC:  No cervical lymphadenopathy. 

PSYCHIATRIC:  Normal mood, normal affect.  The patient is oriented to person, place,

and time. 



LABORATORY DATA:  Mr. Barroso's labs and investigations were reviewed.  He has

normal white count, normocytic anemia with hemoglobin 11.8, normal platelet count.

Normal electrolytes and normal creatinine.  Calcium is mildly elevated at 10.8. 



ASSESSMENT AND PLAN:  Mr. Barroso is a pleasant 63-year-old gentleman, who was

seen at [facility] on June 11, 2019.  His problem list

includes: 

1. Hypertension:  Mr. Barroso has a history of hypertension.  However, his blood

pressure earlier today was decreased at 99/59.  I will hold lisinopril for now.  I

will continue beta blocker during the postoperative period, but at a decreased dose

of 50 mg 2 times a day.  We will monitor vital signs and titrate antihypertensives

as needed. 

2. Dyslipidemia:  The patient is currently not on a statin.  He will need to follow

up with his primary care provider and discuss management of dyslipidemia. 

3. History of pulmonary embolism:  Apixaban is on hold for the surgery.  Resumption

of apixaban at the discretion of Orthopedic Surgery Service. 

4. Morbid obesity:  The patient is being followed by Nutrition Service. 

Many thanks for allowing me to participate in your patient's care.  Please feel free

to contact me with any questions or concerns. 



LEVEL OF RISK:  Moderate.



LEVEL OF COMPLEXITY:  Moderate. 

CC:  Calvin Sigeel MD







Job ID:  [id number]

## 2019-06-12 VITALS — DIASTOLIC BLOOD PRESSURE: 92 MMHG | TEMPERATURE: 98.7 F | SYSTOLIC BLOOD PRESSURE: 161 MMHG

## 2019-06-12 LAB
HGB BLD-MCNC: 10.8 G/DL (ref 14–18)
MCH RBC QN AUTO: 30.1 PG (ref 27–31)
MCV RBC AUTO: 90.2 FL (ref 78–98)
PLATELET # BLD AUTO: 130 THOU/UL (ref 130–400)
RBC # BLD AUTO: 3.6 MILL/UL (ref 4.7–6.1)
WBC # BLD AUTO: 6 THOU/UL (ref 4.8–10.8)

## 2019-06-12 RX ADMIN — HYDROCODONE BITARTRATE AND ACETAMINOPHEN PRN TAB: 10; 325 TABLET ORAL at 03:55

## 2019-06-12 RX ADMIN — HYDROCODONE BITARTRATE AND ACETAMINOPHEN PRN TAB: 10; 325 TABLET ORAL at 09:45

## 2019-06-12 RX ADMIN — Medication SCH: at 11:56

## 2019-06-12 RX ADMIN — HYDROCODONE BITARTRATE AND ACETAMINOPHEN PRN TAB: 10; 325 TABLET ORAL at 15:29

## 2019-06-12 RX ADMIN — VANCOMYCIN HYDROCHLORIDE SCH MLS: 1 INJECTION, POWDER, LYOPHILIZED, FOR SOLUTION INTRAVENOUS at 11:44

## 2019-06-12 NOTE — CON
DATE OF CONSULTATION:  



ADDENDUM:  I would like to make a correction to the consult note dictated on

Adrienned, account #[id number].  The correction is that patient's primary care

provider is Dr. Calvin Siegel, not Dr. Alvino Zuleta.  Please send a copy of the

report to Dr. Siegel' office. 







Job ID:  [id number]

## 2019-06-18 ENCOUNTER — HOSPITAL ENCOUNTER (EMERGENCY)
Dept: HOSPITAL 92 - ERS | Age: 64
Discharge: HOME | End: 2019-06-18
Payer: COMMERCIAL

## 2019-06-18 DIAGNOSIS — F17.210: ICD-10-CM

## 2019-06-18 DIAGNOSIS — I10: Primary | ICD-10-CM

## 2019-06-18 DIAGNOSIS — F31.9: ICD-10-CM

## 2019-06-18 DIAGNOSIS — Z79.01: ICD-10-CM

## 2019-06-18 DIAGNOSIS — E83.52: ICD-10-CM

## 2019-06-18 DIAGNOSIS — Z79.899: ICD-10-CM

## 2019-06-18 DIAGNOSIS — Z86.711: ICD-10-CM

## 2019-06-18 LAB
ALBUMIN SERPL BCG-MCNC: 4 G/DL (ref 3.4–4.8)
ALP SERPL-CCNC: 74 U/L (ref 40–150)
ALT SERPL W P-5'-P-CCNC: 19 U/L (ref 8–55)
ANION GAP SERPL CALC-SCNC: 12 MMOL/L (ref 10–20)
AST SERPL-CCNC: 18 U/L (ref 5–34)
BASOPHILS # BLD AUTO: 0 THOU/UL (ref 0–0.2)
BASOPHILS NFR BLD AUTO: 0.4 % (ref 0–1)
BILIRUB SERPL-MCNC: 0.6 MG/DL (ref 0.2–1.2)
BUN SERPL-MCNC: 19 MG/DL (ref 8.4–25.7)
CALCIUM SERPL-MCNC: 13.5 MG/DL (ref 7.8–10.44)
CHLORIDE SERPL-SCNC: 100 MMOL/L (ref 98–107)
CK SERPL-CCNC: 61 U/L (ref 30–200)
CO2 SERPL-SCNC: 26 MMOL/L (ref 23–31)
CREAT CL PREDICTED SERPL C-G-VRATE: 0 ML/MIN (ref 70–130)
EOSINOPHIL # BLD AUTO: 0.1 THOU/UL (ref 0–0.7)
EOSINOPHIL NFR BLD AUTO: 0.9 % (ref 0–10)
GLOBULIN SER CALC-MCNC: 3.4 G/DL (ref 2.4–3.5)
GLUCOSE SERPL-MCNC: 159 MG/DL (ref 80–115)
HGB BLD-MCNC: 11.5 G/DL (ref 14–18)
LYMPHOCYTES # BLD: 1.7 THOU/UL (ref 1.2–3.4)
LYMPHOCYTES NFR BLD AUTO: 23 % (ref 21–51)
MCH RBC QN AUTO: 29 PG (ref 27–31)
MCV RBC AUTO: 87.8 FL (ref 78–98)
MONOCYTES # BLD AUTO: 0.7 THOU/UL (ref 0.11–0.59)
MONOCYTES NFR BLD AUTO: 9.1 % (ref 0–10)
NEUTROPHILS # BLD AUTO: 4.9 THOU/UL (ref 1.4–6.5)
NEUTROPHILS NFR BLD AUTO: 66.5 % (ref 42–75)
PLATELET # BLD AUTO: 262 THOU/UL (ref 130–400)
POTASSIUM SERPL-SCNC: 4.2 MMOL/L (ref 3.5–5.1)
RBC # BLD AUTO: 3.97 MILL/UL (ref 4.7–6.1)
SODIUM SERPL-SCNC: 134 MMOL/L (ref 136–145)
WBC # BLD AUTO: 7.3 THOU/UL (ref 4.8–10.8)

## 2019-06-18 PROCEDURE — 82550 ASSAY OF CK (CPK): CPT

## 2019-06-18 PROCEDURE — 80053 COMPREHEN METABOLIC PANEL: CPT

## 2019-06-18 PROCEDURE — 85025 COMPLETE CBC W/AUTO DIFF WBC: CPT

## 2019-06-18 PROCEDURE — 93005 ELECTROCARDIOGRAM TRACING: CPT

## 2019-06-18 PROCEDURE — 96374 THER/PROPH/DIAG INJ IV PUSH: CPT

## 2019-06-18 PROCEDURE — 83880 ASSAY OF NATRIURETIC PEPTIDE: CPT

## 2019-06-18 PROCEDURE — 70450 CT HEAD/BRAIN W/O DYE: CPT

## 2019-06-18 PROCEDURE — 71045 X-RAY EXAM CHEST 1 VIEW: CPT

## 2019-06-18 PROCEDURE — 84484 ASSAY OF TROPONIN QUANT: CPT

## 2019-06-18 NOTE — RAD
AP view chest.



HISTORY: Elevated blood pressure.



AP view chest demonstrates the lungs to be well aerated. No evidence of active intrathoracic disease 
seen. No evidence of effusions, pneumonia or pneumothorax seen.



IMPRESSION: Unremarkable AP view chest.



Reported By: Gabino Kinney 

Electronically Signed:  6/18/2019 12:29 PM

## 2019-06-18 NOTE — CT
CT BRAIN 

6/18/19

 

PROVIDED CLINICAL HISTORY:  

Headache. 

 

FINDINGS: 

Comparison 2/26/17.

 

The ventricular system appears normal in size and morphology. There is no evidence for intracranial h
emorrhage or mass effect. The extracranial soft tissue and osseous structures demonstrate an unremark
able CT appearance. 

 

IMPRESSION: 

No evidence for intracranial hemorrhage or mass effect. 

 

POS: [facility]

## 2019-06-22 NOTE — EKG
Test Reason : 

Blood Pressure : ***/*** mmHG

Vent. Rate : 075 BPM     Atrial Rate : 075 BPM

   P-R Int : 222 ms          QRS Dur : 096 ms

    QT Int : 368 ms       P-R-T Axes : 019 027 053 degrees

   QTc Int : 410 ms

 

Sinus rhythm with 1st degree A-V block

Otherwise normal ECG

 

Confirmed by SOHAN RODRIGUEZ, JOSE (12),  CARLINE CORDERO (40) on 6/22/2019 4:41:26 PM

 

Referred By:             Confirmed By:JOSE PARRY MD

## 2021-08-29 NOTE — OP
DATE OF PROCEDURE:  06/10/2019



PREOPERATIVE DIAGNOSIS:  Left knee osteoarthritis.



POSTOPERATIVE DIAGNOSIS:  Left knee osteoarthritis.



PROCEDURE PERFORMED:  Left total knee arthroplasty.



ANESTHESIA:  General in addition to femoral and sciatic nerve blocks.



ASSISTANT:  Davidson Nicole PA-C



TOURNIQUET TIME:  85 minutes at 300 mmHg.



IMPLANTS:  DePuy system was used with a size 5 femur, size 5 tibia, 8 mm poly

insert, and 38 mm all poly patellar button.  The Dileep Simplex methylmethacrylate

cement was also used. 



DRAINS:  None.



SPECIMEN:  None.



OUTCOME:  Stable total knee.



INDICATIONS:  Mr. Barroso is a 63-year-old gentleman who is now status post right

total knee arthroplasty and doing very well from the surgery.  He is now having

increasing pain in the left knee with bone-on-bone degenerative changes of the

medial compartment.  After discussion with the patient including risks and benefits,

we have decided to proceed with left total knee arthroplasty.  The patient also has

a history of DVT with PE and as such after discussion with his pulmonologist, we

decided to premedicate the patient with Eliquis for 2 weeks prior to surgery with

plans to treat him for 3 months of Eliquis postop.  Again, informed consent has been

obtained. 



DESCRIPTION OF PROCEDURE:  The patient was brought to the operating room and a

time-out performed followed by induction of general anesthesia.  Next, the patient

was positioned supine and then a sterile prep and drape performed to the left lower

extremity.  Next, the limb was exsanguinated with Esmarch bandage, tourniquet

inflated to 300 mmHg.  A midline anterior knee incision was made followed by a

medial parapatellar arthrotomy.  The patella was inverted and the knee flexed.

Next, the remnants of the prepatellar fat pad, menisci, and ACL were excised.  This

was followed by utilization of the step drill to obtain a starting point at the

distal femur, followed by intramedullary jig placement.  A distal cut was performed

and then the femur was sized to a size 5.  The four-in-one cutting block was pinned

in place and then the final cuts performed.  Osteophytes were then resected.  Next,

attention was placed at the tibia.  Again using intramedullary alignment, the

proximal tibial cut was performed.  The PCL was left intact.  Osteophytes were

excised from around the margin of the tibia and then trialing to a size 5 showed

good fit.  Next, the reamer and cruciform punch were utilized to further prepare the

proximal tibia.  The trial 5 insert was left in place.  At this point, there was

found to be excessive tightness both in flexion and extension.  As such, an

additional 4 mm was taken off the proximal tibia.  After further preparation with

the reamer and punch, the trial was left in place and the 8 mm poly insert provided

excellent fit and fill on both extension and flexion.  Next, the patella was

resurfaced freehand with PEG holes drilled to accept the final component.  Cement

was mixed and then the tibial cemented in place followed by the femoral component.

Excess cement removed and then the 8 mm poly insert was introduced into the knee and

the knee brought into full extension and compression.  The patella was then cemented

in place and held with a clamp.  At the completion of this, excess cement was all

removed and then the knee again irrigated with Pulsavac, a total of 3 L used during

the course of the case.  At the completion of this, wound closure performed with #2

Vicryl for the quadriceps mechanism, 2-0 Vicryl subcutaneously and staples for the

skin.  Xeroform gauze and Ace wrap dressing were applied to the knee and then the

patient was transferred to recovery room in stable condition.  There were no

complications.  The patient tolerated the procedure well. 







Job ID:  [id number]
English

## 2022-07-09 NOTE — PDOC.PN
- Subjective


Encounter Start Date: 06/12/19


Encounter Start Time: 08:25


Subjective: no sob, feels better


-: is seen amb with PT





- Objective


MAR Reviewed: Yes


Vital Signs & Weight: 


 Vital Signs (12 hours)











  Temp Pulse Resp BP BP Pulse Ox


 


 06/12/19 11:18  97.6 F  81  20   163/79 H  97


 


 06/12/19 07:39  97.8 F  71  18   139/83  94 L


 


 06/12/19 04:14  98.0 F  76  20  156/83 H   95








 Weight











Admit Weight                   300 lb


 


Weight                         300 lb














I&O: 


 











 06/11/19 06/12/19 06/13/19





 06:59 06:59 06:59


 


Intake Total 2150 2885 


 


Output Total 2175 3100 


 


Balance -25 -215 











Result Diagrams: 


 06/12/19 04:33





 06/03/19 10:08





Phys Exam





- Physical Examination


HEENT: PERRLA, moist MMs


Neck: no JVD, supple


Respiratory: no wheezing, no rales


Cardiovascular: RRR, no significant murmur


Gastrointestinal: soft, non-tender, positive bowel sounds


Musculoskeletal: pulses present


left knee in dressing


Neurological: non-focal, moves all 4 limbs


Psychiatric: normal affect, A&O x 3





Dx/Plan


(1) Status post total knee replacement, left


Code(s): Z96.652 - PRESENCE OF LEFT ARTIFICIAL KNEE JOINT   Status: Acute   





(2) Bipolar disorder


Code(s): F31.9 - BIPOLAR DISORDER, UNSPECIFIED   Status: Chronic   


Qualifiers: 


   Active/Remission status: remission status unspecified   Qualified Code(s): 

F31.9 - Bipolar disorder, unspecified   





(3) COPD (chronic obstructive pulmonary disease)


Status: Chronic   


Qualifiers: 


   COPD type: unspecified COPD   Qualified Code(s): J44.9 - Chronic obstructive 

pulmonary disease, unspecified   





(4) Hypertension


Code(s): I10 - ESSENTIAL (PRIMARY) HYPERTENSION   Status: Chronic   


Qualifiers: 


 





(5) Morbid obesity with BMI of 40.0-44.9, adult


Code(s): E66.01 - MORBID (SEVERE) OBESITY DUE TO EXCESS CALORIES; Z68.41 - BODY 

MASS INDEX (BMI) 40.0-44.9, ADULT   Status: Chronic   





(6) Tobacco abuse


Code(s): Z72.0 - TOBACCO USE   Status: Chronic   





- Plan


hemostable


-: is doing well post op


-: continue toprol xl, asp bid, zoloft


-: pain mgmt per anesthesia, may dc buprenorphine weekly


-: dc plan per ortho adv, he will need outpt sleep study





* .








Review of Systems





- Medications/Allergies


Allergies/Adverse Reactions: 


 Allergies











Allergy/AdvReac Type Severity Reaction Status Date / Time


 


naproxen [From Naprosyn] Allergy  Hives Verified 06/03/19 09:37











Medications: 


 Current Medications





Acetaminophen (Tylenol)  1,000 mg PO Q6H PRN


   PRN Reason: HA/T > 101F/Mild Pain (1-3)


Hydrocodone Bitart/Acetaminophen (Norco 10/325)  1 tab PO Q4H PRN


   PRN Reason: Pain (1-3)


Hydrocodone Bitart/Acetaminophen (Norco 10/325)  2 tab PO Q4H PRN


   PRN Reason: PAIN (4-6)


   Last Admin: 06/12/19 09:45 Dose:  2 tab


Apixaban (Eliquis)  2.5 mg PO DAILY [facility]


   Last Admin: 06/12/19 10:02 Dose:  2.5 mg


Cyclobenzaprine HCl (Flexeril)  10 mg PO HS [facility]


   Last Admin: 06/11/19 21:06 Dose:  10 mg


Famotidine (Pepcid)  20 mg PO DAILY [facility]


   Last Admin: 06/12/19 09:44 Dose:  20 mg


Fentanyl (Sublimaze)  50 mcg IV Q1H PRN


   PRN Reason:  BREAKTHROUGH PAIN


   Last Admin: 06/11/19 17:44 Dose:  50 mcg


Ropivacaine 250 ml/ Device  250 mls @ 0 mls/hr NERVE BLCK INF [facility]


   Last Admin: 06/11/19 15:12 Dose:  250 mls


Sodium Chloride (Normal Saline 0.9%)  1,000 mls @ 100 mls/hr IV .Q10H [facility]


   Last Admin: 06/12/19 11:56 Dose:  Not Given


Metoprolol Succinate (Toprol Xl)  50 mg PO DAILY [facility]


   Last Admin: 06/12/19 09:44 Dose:  50 mg


Morphine Sulfate (Morphine)  2 mg SLOW IVP Q2H PRN


   PRN Reason: MOD BREAKTHRU PAIN


   Last Admin: 06/11/19 21:06 Dose:  2 mg


Morphine Sulfate (Morphine)  4 mg SLOW IVP Q2H PRN


   PRN Reason: SEVERE BREAKTHRU Pain


Non-Formulary Medication (Buprenorphine [Buprenorphine])  10 mcg PO Q7DAYS [facility]


Ondansetron HCl (Zofran)  4 mg IVP Q6H PRN


   PRN Reason: Nausea/Vomiting


Promethazine HCl (Phenergan)  12.5 mg IM Q4H PRN


   PRN Reason: Nausea


Sertraline HCl (Zoloft)  300 mg PO HS [facility]


   Last Admin: 06/11/19 21:05 Dose:  300 mg


Sodium Chloride (Flush - Normal Saline)  10 ml IVF Q12HR [facility]


   Last Admin: 06/12/19 11:56 Dose:  Not Given


Sodium Chloride (Flush - Normal Saline)  10 ml IVF PRN PRN


   PRN Reason: Saline Flush


Sodium Chloride (Flush - Normal Saline)  10 ml IVF PRN PRN


   PRN Reason: Saline Flush


Tramadol HCl (Ultram)  50 mg PO Q6H PRN


   PRN Reason: Mild Pain (1-3)


Tramadol HCl (Ultram)  100 mg PO Q6H PRN


   PRN Reason: Moderate Pain 4-6


   Last Admin: 06/12/19 11:48 Dose:  100 mg


Zolpidem Tartrate (Ambien)  5 mg PO HSPRN PRN


   PRN Reason: Insomnia Yes

## 2024-02-08 NOTE — PET
PET CT:

 

HISTORY:  

63-year-old male with solitary pulmonary nodule. Bronchial brushings were negative for malignancy. 

 

TECHNIQUE:  

PET scanning with CT attenuation correction was performed from the base of the brain through the prox
imal thighs following the intravenous administration of 10.5 mCi F18-FDG in the left antecubital candi
a. 

 

FINDINGS:

 

Correlation is made with CT chest dated 11/10/18. 

 

No abnormal FDG localization is seen in the right lung nodule noted on the CT scan. This demonstrates
 a SUV of 2. 

 

No madyson hypermetabolism is seen in the neck, chest, axilla, abdomen, or pelvis. No hypermetabolic pu
lmonary nodules, liver, adrenal, or skeletal lesions are seen. 

 

There is physiologic activity in the GI and  tracts, and the visualized portions of the brain. 

 

The CT scan used for attenuation correction demonstrates no evidence of pleural effusions or ascites.
 There is sigmoid diverticulosis and fatty infiltration of the liver. 

 

IMPRESSION: 

No abnormal FDG localization in the right lung nodule. A follow-up CT scan is recommended in 3 months
. 

 

 

 

 

POS: [facility] 